# Patient Record
(demographics unavailable — no encounter records)

---

## 2024-12-19 NOTE — ASSESSMENT
[FreeTextEntry1] : 81 YO female with history of Parkinson's disease, HLD, HTN presents today for initial evaluation of acute right caudate head lacunar infarction found incidentally on outpatient MRI, also found to have 50% stenosis of the left cervical ICA. She has history of chronic right basal ganglia and left thalamic lacunar infarcts as well.  I have personally reviewed available neuroradiological images. Physical exam stable, no new deficit.  Recommendations: - Continue Aspirin 81 mg daily indefinitely for secondary stroke prevention - decrease pravastatin from 80 mg to 40 mg qhs for goal LDL <70 - Continue to follow up with cardiology - echo and rhythm monitoring - continue f/u with primary neurologist for management of PD  Stroke risk factor modifications discussed in detail - Blood pressure control - Glucose and cholesterol control - Healthy lifestyle, diet, physical activity as tolerated  Follow up in 6 months or sooner if needed. All questions and concerns were addressed.

## 2024-12-19 NOTE — PHYSICAL EXAM
[FreeTextEntry1] : GENERAL PHYSICAL EXAM: GEN: no distress, normal affect  NEUROLOGICAL EXAM: Mental Status Orientation: alert and oriented to person, place, time, and situation Language: clear and fluent, intact comprehension and repetition. No dysarthria.  Cranial Nerves II: visual fields full to confrontation III, IV, VI: PERRL, EOMI V, VII: facial sensation and movement intact and symmetric IX, X: uvula midline, soft palate elevates normally XII: tongue midline  Motor 5/5 strength bilaterally in the UE and LE Tone and bulk are normal in upper and lower limbs No pronator drift  Sensation Intact to light touch in all 4 EXTs. No extinction  Resting tremor of the upper extremities bilaterally, worse on the right + cogwheel rigidity in the RUE Bradykinesia.   Gait walks with shuffle

## 2024-12-19 NOTE — ASSESSMENT
[FreeTextEntry1] : 83 YO female with history of Parkinson's disease, HLD, HTN presents today for initial evaluation of acute right caudate head lacunar infarction found incidentally on outpatient MRI, also found to have 50% stenosis of the left cervical ICA. She has history of chronic right basal ganglia and left thalamic lacunar infarcts as well.  I have personally reviewed available neuroradiological images. Physical exam stable, no new deficit.  Recommendations: - Continue Aspirin 81 mg daily indefinitely for secondary stroke prevention - decrease pravastatin from 80 mg to 40 mg qhs for goal LDL <70 - Continue to follow up with cardiology - echo and rhythm monitoring - continue f/u with primary neurologist for management of PD  Stroke risk factor modifications discussed in detail - Blood pressure control - Glucose and cholesterol control - Healthy lifestyle, diet, physical activity as tolerated  Follow up in 6 months or sooner if needed. All questions and concerns were addressed.

## 2024-12-19 NOTE — HISTORY OF PRESENT ILLNESS
[FreeTextEntry1] : Ayaka Vasquez is a 81 YO female with history of Parkinson's disease, HLD, HTN presents today for follow up. She is accompanied by aide in person and son Esequiel via phone.  She is compliant with ASA, they completed supply of Plavix. They have not seen cardiologist since last visit. Per son she is taking pravastatin 80 mg nightly. She is on Sinement  1.5 tab TID, as prescribed by outside neurologist. She denies any new symptoms.   __________  Initial visit 8/15/24: pt here for initial evaluation of acute lacunar infarction found on recent MRI. MRI brain 7/15/2024 revealed focus of diffusion restriction present in the right caudate head compatible with recent lacunar infarction along with chronic right basal ganglia and left thalamic lacunar infarcts. 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 MRA H/N 8/2/2024 revealed areas of luminal stenosis involving the proximal left cervical ICA with estimated 50% stenosis and less than 50% stenosis of the proximal right cervical ICA but no significant intracranial stenosis. She is on aspirin 81 mg daily and was recently started on Plavix 75 mg daily. Also, on pravastatin 80 mg. Tolerating all medications well. No significant bleeding. No myalgias. Denies interval stroke/TIA symptoms.

## 2025-01-17 NOTE — ASSESSMENT
[FreeTextEntry1] : Ayaka is an 82 F who is presenting for evaluation for issues with regurgitation/coughing.   1. Coughing/regurgitation: suspect this is related to oropharyngeal dysphagia given hx Parkinson's disease. She is not having any pain, possibly some acid reflux symptoms.  -Will start with esophagram to assess for oropharyngeal dysphagia - could benefit from SLP evaluation as well.  -Depending on findings could consider upper endoscopy - would likely need to be done in hospital setting -Consider empiric trial of PPI depending on above.

## 2025-01-17 NOTE — PHYSICAL EXAM
[Normal] : heart rate was normal and rhythm regular, normal S1 and S2, no murmurs [Oriented To Time, Place, And Person] : oriented to person, place, and time

## 2025-02-26 NOTE — HISTORY OF PRESENT ILLNESS
[FreeTextEntry1] : 82 year old female diagnosed with PD 1 year ago. Managed currently by local neurologist Initial symptoms were tremors which started 8-10 years ago. 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 Tremors were on both hands, does not feel it is worse on one side. Never tried medications for her ET in the past, it is not disturbing enough.  Started L-dopa about 1 year ago which helped with her tremors. As she increased the dose of L-dopa from 1.5 to 2 tabs TID she had more nausea, gagging and increased plegm production. GI saw her and did an esophageal which was reportedly consistent with age related changes. She was taking antacids for a while, but it was discontinued. Reports reduced appetite.   In the last year her gait has changed. Legs appear stiff and shuffles. She walks at the [employer]. No PT At this time. No exercise regimen. She had a fall 1 month ago as she was walking to her closet. She uses a walker. She has a HHA Monday to Friday 10-3pm needs help with most ADLs able to eat and drink on her own.  History of tardive dyskinesia in her face after taking an antipsychotic. Stopped it 6 months ago.  no fhx of neurolological conditions  Nonmotor  Sleeps well. No RBD, hx of falling oob has a guard rail no dysphagia No constipation Reports UI x 1 month  repeats the same questions. son manages medications for the last 2 years.   PMHx: ischemic stroke, anxiety, depression, breast Ca, HTN, HLD  current medication  Rytary 3 capsules TID unsure what dose 11-3-7 methylphenadite  prozac ASA   past medication Carbidopa/Levodopa 2 tabs TID-developed nausea and regurgitation

## 2025-02-26 NOTE — PHYSICAL EXAM
[General Appearance - Alert] : alert [Cranial Nerves Oculomotor (III)] : extraocular motion intact [Motor Strength] : muscle strength was normal in all four extremities [Sensation Tactile Decrease] : light touch was intact [Person] : oriented to person [Naming Objects] : no difficulty naming common objects [Repeating Phrases] : no difficulty repeating a phrase [Cranial Nerves Optic (II)] : visual acuity intact bilaterally,  visual fields full to confrontation, pupils equal round and reactive to light [Cranial Nerves Trigeminal (V)] : facial sensation intact symmetrically [Cranial Nerves Facial (VII)] : face symmetrical [Cranial Nerves Vestibulocochlear (VIII)] : hearing was intact bilaterally [Cranial Nerves Accessory (XI - Cranial And Spinal)] : head turning and shoulder shrug symmetric [Cranial Nerves Hypoglossal (XII)] : there was no tongue deviation with protrusion [Place] : disoriented to place [Time] : disoriented to time [FreeTextEntry1] : +2 masking, left facial grimacing  Vertical eye movements intact without square wave jerks +1 speech +2 Rigidity of neck rotation +2 Rigidity of limbs present with contralateral activation  +2 L>R Resting tremor +2 L>R Action tremor +1 Postural tremor  +2 Bradykinesia with finger tapping, hand supination/pronation, foot tapping, foot stomping. No dysmetria with finger to nose Gait: able to stand with hands crossed and without assistance, slowly +1 posture Slow gait initiation, decreased stride length, reduced arm swing, no freezing of gait upon turning, requires multiple steps with turning. negative pull test

## 2025-02-26 NOTE — HISTORY OF PRESENT ILLNESS
[FreeTextEntry1] : 82 year old female diagnosed with PD 1 year ago. Managed currently by local neurologist Initial symptoms were tremors which started 8-10 years ago. 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 Tremors were on both hands, does not feel it is worse on one side. Never tried medications for her ET in the past, it is not disturbing enough.  Started L-dopa about 1 year ago which helped with her tremors. As she increased the dose of L-dopa from 1.5 to 2 tabs TID she had more nausea, gagging and increased plegm production. GI saw her and did an esophageal which was reportedly consistent with age related changes. She was taking antacids for a while, but it was discontinued. Reports reduced appetite.   In the last year her gait has changed. Legs appear stiff and shuffles. She walks at the [employer]. No PT At this time. No exercise regimen. She had a fall 1 month ago as she was walking to her closet. She uses a walker. She has a HHA Monday to Friday 10-3pm needs help with most ADLs able to eat and drink on her own.  History of tardive dyskinesia in her face after taking an antipsychotic. Stopped it 6 months ago.  no fhx of neurolological conditions  Nonmotor  Sleeps well. No RBD, hx of falling oob has a guard rail no dysphagia No constipation Reports UI x 1 month  repeats the same questions. son manages medications for the last 2 years.   PMHx: ischemic stroke, anxiety, depression, breast Ca, HTN, HLD  current medication  Rytary 3 capsules TID unsure what dose 11-3-7 methylphenadite  prozac ASA   past medication Carbidopa/Levodopa 2 tabs TID-developed nausea and regurgitation

## 2025-02-26 NOTE — HISTORY OF PRESENT ILLNESS
[FreeTextEntry1] : 82 year old female diagnosed with PD 1 year ago. Managed currently by local neurologist Initial symptoms were tremors which started 8-10 years ago. 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 Tremors were on both hands, does not feel it is worse on one side. Never tried medications for her ET in the past, it is not disturbing enough.  Started L-dopa about 1 year ago which helped with her tremors. As she increased the dose of L-dopa from 1.5 to 2 tabs TID she had more nausea, gagging and increased plegm production. GI saw her and did an esophageal which was reportedly consistent with age related changes. She was taking antacids for a while, but it was discontinued. Reports reduced appetite.   In the last year her gait has changed. Legs appear stiff and shuffles. She walks at the [employer]. No PT At this time. No exercise regimen. She had a fall 1 month ago as she was walking to her closet. She uses a walker. She has a HHA Monday to Friday 10-3pm needs help with most ADLs able to eat and drink on her own.  History of tardive dyskinesia in her face after taking an antipsychotic. Stopped it 6 months ago.  no fhx of neurolological conditions  Nonmotor  Sleeps well. No RBD, hx of falling oob has a guard rail no dysphagia No constipation Reports UI x 1 month  repeats the same questions. son manages medications for the last 2 years.   PMHx: ischemic stroke, anxiety, depression, breast Ca, HTN, HLD  current medication  Rytary 3 capsules TID unsure what dose 11-3-7 methylphenadite  prozac ASA   past medication Carbidopa/Levodopa 2 tabs TID-developed nausea and regurgitation

## 2025-02-26 NOTE — DISCUSSION/SUMMARY
[FreeTextEntry1] : 82 year old female presents to establish care. She has a hx of hand tremors for about 8 years and gait imbalance for approx. 2 years. She has problems with short term memory without VH suspicious for cognitive impairment. Current regimen is helping her tremors but is causing nausea. Their are no atypical features at this time.   Patient was counseled on the following recommendations Restart LDopa 1.5 tabs TID. Stop rytary Initiate lodosyn 25mg TID for nausea  Refer to PT Encouraged to increase exercise and physical activity and maintain an active social and intellectual life. Offered NPT to characterize cognitive symptoms, declines at this time may consider rivastigmine in the future   f/u in 3-4 months

## 2025-04-01 NOTE — HISTORY OF PRESENT ILLNESS
[FreeTextEntry1] : 82 year old female diagnosed with PD 1 year ago. Managed currently by local neurologist Initial symptoms were tremors which started 8-10 years ago. 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 Tremors were on both hands, does not feel it is worse on one side. Never tried medications for her ET in the past, it is not disturbing enough.  Started L-dopa about 1 year ago which helped with her tremors. As she increased the dose of L-dopa from 1.5 to 2 tabs TID she had more nausea, gagging and increased plegm production. GI saw her and did an esophageal which was reportedly consistent with age related changes. She was taking antacids for a while, but it was discontinued. Reports reduced appetite.   In the last year her gait has changed. Legs appear stiff and shuffles. She walks at the [place]. No PT At this time. No exercise regimen. She had a fall 1 month ago as she was walking to her closet. She uses a walker. She has a HHA Monday to Friday 10-3pm needs help with most ADLs able to eat and drink on her own.  History of tardive dyskinesia in her face after taking an antipsychotic. Stopped it 6 months ago.  no fhx of neurolological conditions  Interim hx  weaned off sinemet last week and since coming off she feels like all her GI symptoms resolved. No nausea or phlegmy spit up.  Tremors worsened slightly since coming off medication, Patient is not bothered by her tremors. They do not interfere with functionality Appetite is better since coming off sinemet. Gait is stable. No recent falls. Started PT, went to the hospital for the flu which interrupted her PT.  Nonmotor  Sleeps well. no dysphagia No constipation Reports UI occasional repeats the same questions. son manages medications for the last 2 years.   PMHx: ischemic stroke, anxiety, depression, breast Ca, HTN, HLD  current medication  methylphenadite  prozac ASA   past medication Carbidopa/Levodopa 2 tabs TID-developed nausea and regurgitation

## 2025-04-01 NOTE — PHYSICAL EXAM
[General Appearance - Alert] : alert [Motor Strength] : muscle strength was normal in all four extremities [FreeTextEntry1] : +2 masking, left facial grimacing  Vertical eye movements intact without square wave jerks +1 speech +2 Rigidity of neck rotation +2 Rigidity of limbs present with contralateral activation  +2 L>R Resting tremor +2 L>R Action tremor +1 Postural tremor  +2 Bradykinesia with finger tapping, hand supination/pronation, foot tapping, foot stomping. No dysmetria with finger to nose Gait: able to stand with hands crossed and without assistance, slowly +1 posture Slow gait initiation, decreased stride length, reduced arm swing, no freezing of gait upon turning, requires multiple steps with turning. negative pull test

## 2025-04-01 NOTE — DISCUSSION/SUMMARY
[FreeTextEntry1] : 82 year old female presents for Parkinsonism follow up. She has a hx of hand tremors for about 8 years and gait imbalance for approx. 2 years. Doing well off dopaminergic medications at this time. Tremors are mild and do not interfere with functionality    Patient was counseled on the following recommendations Continue PT  f/u in 4 months